# Patient Record
Sex: MALE | Race: WHITE | NOT HISPANIC OR LATINO | RURAL
[De-identification: names, ages, dates, MRNs, and addresses within clinical notes are randomized per-mention and may not be internally consistent; named-entity substitution may affect disease eponyms.]

---

## 2024-08-22 ENCOUNTER — OFFICE VISIT (OUTPATIENT)
Dept: RURAL CLINIC 4 | Facility: CLINIC | Age: 64
End: 2024-08-22
Payer: COMMERCIAL

## 2024-08-22 ENCOUNTER — DASHBOARD ENCOUNTERS (OUTPATIENT)
Age: 64
End: 2024-08-22

## 2024-08-22 VITALS
TEMPERATURE: 97.1 F | HEIGHT: 70 IN | SYSTOLIC BLOOD PRESSURE: 130 MMHG | DIASTOLIC BLOOD PRESSURE: 92 MMHG | HEART RATE: 70 BPM | WEIGHT: 215.6 LBS | BODY MASS INDEX: 30.86 KG/M2

## 2024-08-22 DIAGNOSIS — K70.30 ALCOHOLIC CIRRHOSIS, UNSPECIFIED WHETHER ASCITES PRESENT: ICD-10-CM

## 2024-08-22 PROBLEM — 14783006: Status: ACTIVE | Noted: 2024-08-22

## 2024-08-22 PROBLEM — 34742003: Status: ACTIVE | Noted: 2024-08-22

## 2024-08-22 PROBLEM — 420054005: Status: ACTIVE | Noted: 2024-08-22

## 2024-08-22 PROCEDURE — 99204 OFFICE O/P NEW MOD 45 MIN: CPT | Performed by: INTERNAL MEDICINE

## 2024-08-22 RX ORDER — POTASSIUM CHLORIDE 1500 MG/1
TABLET, EXTENDED RELEASE ORAL
Qty: 30 TABLET | Status: ACTIVE | COMMUNITY

## 2024-08-22 RX ORDER — METOPROLOL SUCCINATE 25 MG/1
TABLET, EXTENDED RELEASE ORAL
Qty: 30 TABLET | Status: DISCONTINUED | COMMUNITY

## 2024-08-22 RX ORDER — METRONIDAZOLE 500 MG/1
TABLET, FILM COATED ORAL
Qty: 10 TABLET | Status: DISCONTINUED | COMMUNITY

## 2024-08-22 RX ORDER — METRONIDAZOLE 500 MG/1
TAKE 1 TABLET BY MOUTH TWICE DAILY FOR 5 DAYS TABLET, FILM COATED ORAL
Qty: 10 EACH | Refills: 0 | Status: DISCONTINUED | COMMUNITY

## 2024-08-22 RX ORDER — LACTULOSE 10 G/15ML
SOLUTION ORAL
Qty: 900 MILLILITER | Status: DISCONTINUED | COMMUNITY

## 2024-08-22 RX ORDER — THIAMINE HCL 100 MG
1 TABLET TABLET ORAL ONCE A DAY
Status: ACTIVE | COMMUNITY

## 2024-08-22 RX ORDER — FOLIC ACID 1 MG/1
TAKE 1 TABLET BY MOUTH ONCE DAILY TABLET ORAL
Qty: 30 EACH | Refills: 0 | Status: ACTIVE | COMMUNITY

## 2024-08-22 RX ORDER — METOPROLOL SUCCINATE 25 MG/1
TAKE 1 TABLET BY MOUTH ONCE DAILY TABLET, EXTENDED RELEASE ORAL
Qty: 30 EACH | Refills: 0 | Status: ACTIVE | COMMUNITY

## 2024-08-22 RX ORDER — ASPIRIN 325 MG/1
TAKE 1 TABLET BY MOUTH ONCE DAILY TABLET, COATED ORAL
Qty: 30 EACH | Refills: 0 | Status: ACTIVE | COMMUNITY

## 2024-08-22 RX ORDER — LACTULOSE 10 G/15ML
TAKE 30ML (20G) BY MOUTH DAILY FOR 30 DAYS. HOLD IF MORE THAN 3 LOOSE BMS DAILY SOLUTION ORAL
Qty: 900 MILLILITER | Refills: 0 | Status: ACTIVE | COMMUNITY

## 2024-08-22 NOTE — HPI-TODAY'S VISIT:
The pt is a 64 y/o gentleman, accompanied by his wife who is a registered nurse and is f/u from a recent hospitalization for SOB/chest pain and CTA incidentally reveals cirrhosis of the liver, portal hypertension and mild ascites. He said he has been drinking alcohol heavily for many yrs stating a quart of vodka will last 3 days and drinks  6-12 cans of Bud Light on daily basis. He has abstained from alcohol since hie was admitted to the hospital on 8/8/24.  MELD score of  10  was calculated during his hospitalization and had an elevated ammonia level of 80 and was started on Lactulose BID and continues. He has noticed some mild bilateral  LE edema and there has been no change in his abdominal girth. He offers no c/o jaundice, weakness or weight loss.

## 2024-08-28 PROBLEM — 161667004: Status: ACTIVE | Noted: 2024-08-28

## 2024-09-05 ENCOUNTER — TELEPHONE ENCOUNTER (OUTPATIENT)
Dept: RURAL CLINIC 4 | Facility: CLINIC | Age: 64
End: 2024-09-05

## 2024-09-05 RX ORDER — SPIRONOLACTONE 25 MG/1
1 TABLET TABLET, FILM COATED ORAL DAILY
Qty: 30 TABLET | Refills: 5 | OUTPATIENT
Start: 2024-09-10 | End: 2025-03-08

## 2024-09-10 PROBLEM — 449614009: Status: ACTIVE | Noted: 2024-09-10

## 2024-09-10 PROBLEM — 420054005: Status: ACTIVE | Noted: 2024-09-10

## 2024-10-02 ENCOUNTER — TELEPHONE ENCOUNTER (OUTPATIENT)
Dept: RURAL CLINIC 8 | Facility: CLINIC | Age: 64
End: 2024-10-02

## 2024-10-02 ENCOUNTER — TELEPHONE ENCOUNTER (OUTPATIENT)
Dept: RURAL CLINIC 4 | Facility: CLINIC | Age: 64
End: 2024-10-02

## 2024-10-02 ENCOUNTER — LAB OUTSIDE AN ENCOUNTER (OUTPATIENT)
Dept: RURAL CLINIC 8 | Facility: CLINIC | Age: 64
End: 2024-10-02

## 2024-10-02 PROBLEM — 1082601000119104: Status: ACTIVE | Noted: 2024-10-02

## 2024-10-07 ENCOUNTER — LAB OUTSIDE AN ENCOUNTER (OUTPATIENT)
Dept: RURAL CLINIC 4 | Facility: CLINIC | Age: 64
End: 2024-10-07

## 2024-10-14 ENCOUNTER — LAB OUTSIDE AN ENCOUNTER (OUTPATIENT)
Dept: URBAN - METROPOLITAN AREA CLINIC 19 | Facility: CLINIC | Age: 64
End: 2024-10-14

## 2024-10-15 ENCOUNTER — WEB ENCOUNTER (OUTPATIENT)
Dept: URBAN - METROPOLITAN AREA CLINIC 19 | Facility: CLINIC | Age: 64
End: 2024-10-15

## 2024-10-16 ENCOUNTER — OFFICE VISIT (OUTPATIENT)
Dept: URBAN - METROPOLITAN AREA CLINIC 19 | Facility: CLINIC | Age: 64
End: 2024-10-16
Payer: COMMERCIAL

## 2024-10-16 ENCOUNTER — LAB OUTSIDE AN ENCOUNTER (OUTPATIENT)
Dept: URBAN - METROPOLITAN AREA CLINIC 19 | Facility: CLINIC | Age: 64
End: 2024-10-16

## 2024-10-16 VITALS
TEMPERATURE: 97.7 F | WEIGHT: 193.4 LBS | SYSTOLIC BLOOD PRESSURE: 120 MMHG | DIASTOLIC BLOOD PRESSURE: 80 MMHG | HEIGHT: 70 IN | BODY MASS INDEX: 27.69 KG/M2 | HEART RATE: 83 BPM

## 2024-10-16 DIAGNOSIS — K74.69 OTHER CIRRHOSIS OF LIVER: ICD-10-CM

## 2024-10-16 DIAGNOSIS — Z95.2 HEART VALVE REPLACED: ICD-10-CM

## 2024-10-16 DIAGNOSIS — K76.6 PORTAL HYPERTENSION: ICD-10-CM

## 2024-10-16 PROCEDURE — 99214 OFFICE O/P EST MOD 30 MIN: CPT

## 2024-10-16 RX ORDER — POTASSIUM CHLORIDE 1500 MG/1
TABLET, EXTENDED RELEASE ORAL
Qty: 30 TABLET | Status: ACTIVE | COMMUNITY

## 2024-10-16 RX ORDER — METOPROLOL SUCCINATE 25 MG/1
TAKE 1 TABLET BY MOUTH ONCE DAILY TABLET, EXTENDED RELEASE ORAL
Qty: 30 EACH | Refills: 0 | Status: ACTIVE | COMMUNITY

## 2024-10-16 RX ORDER — FOLIC ACID 1 MG/1
TAKE 1 TABLET BY MOUTH ONCE DAILY TABLET ORAL
Qty: 30 EACH | Refills: 0 | Status: ACTIVE | COMMUNITY

## 2024-10-16 RX ORDER — LACTULOSE 10 G/15ML
TAKE 30ML (20G) BY MOUTH DAILY FOR 30 DAYS. HOLD IF MORE THAN 3 LOOSE BMS DAILY SOLUTION ORAL
Qty: 900 MILLILITER | Refills: 0 | Status: ACTIVE | COMMUNITY

## 2024-10-16 RX ORDER — THIAMINE HCL 100 MG
1 TABLET TABLET ORAL ONCE A DAY
Status: ACTIVE | COMMUNITY

## 2024-10-16 RX ORDER — METOLAZONE 10 MG/1
1 TABLET TABLET ORAL
Status: ACTIVE | COMMUNITY

## 2024-10-16 RX ORDER — SPIRONOLACTONE 25 MG/1
1 TABLET TABLET, FILM COATED ORAL DAILY
Qty: 30 TABLET | Refills: 5 | Status: ACTIVE | COMMUNITY
Start: 2024-09-10 | End: 2025-03-08

## 2024-10-16 RX ORDER — ASPIRIN 325 MG/1
TAKE 1 TABLET BY MOUTH ONCE DAILY TABLET, COATED ORAL
Qty: 30 EACH | Refills: 0 | Status: ACTIVE | COMMUNITY

## 2024-10-16 NOTE — HPI-TODAY'S VISIT:
Mr. Troy is a 63-year-old male with PMH of alcohol cirrhosis with ascites, cardiac valve replacement, portal hypertension presents today for follow-up.  He was last seen in GI clinic by Tabitha Garza NP on 8/22/2024 for hospital follow-up where he was found incidentally with cirrhosis.  At that time he was started on lactulose twice daily and he had noticed mild BLE edema. His edema worsened and he was prescribed torsemide and spironolactone 25 mg daily with improvement. He underwent a paracentesis on 10/11/2024 with approximately 5100 mL removed. Last alcohol drink was in August and he is committed to sobriety. Had an echo and normal cardiac cath with Dr. Fontana.  8/30/2024 labs: Low sodium 135, BUN 8, albumin 3.1, elevated AST 68, , T. bili 1.6, elevated .6, absolute monocytes 1.1, normal AFP 6.0, elevated INR 1.20, PT 15.8

## 2024-10-17 ENCOUNTER — OFFICE VISIT (OUTPATIENT)
Dept: RURAL CLINIC 4 | Facility: CLINIC | Age: 64
End: 2024-10-17

## 2024-10-17 LAB
ALBUMIN: 2.8
ALKALINE PHOSPHATASE: 178
ALT (SGPT): 17
AMMONIA, PLASMA: 120
AST (SGOT): 38
BILIRUBIN, TOTAL: 0.9
BUN/CREATININE RATIO: 11
BUN: 11
CALCIUM: 8.2
CARBON DIOXIDE, TOTAL: 28
CHLORIDE: 95
CREATININE: 1.01
EGFR: 84
GLOBULIN, TOTAL: 4.1
GLUCOSE: 122
HEMATOCRIT: 42.2
HEMOGLOBIN: 14.7
INR: 1.1
MCH: 34.5
MCHC: 34.8
MCV: 99
NRBC: (no result)
ONE SPECIMEN IDENTIFIER: (no result)
PLATELETS: 329
POTASSIUM: 4.2
PROTEIN, TOTAL: 6.9
PROTHROMBIN TIME: 12.1
RBC: 4.26
RDW: 11.7
SODIUM: 136
WBC: 12.5

## 2024-10-23 ENCOUNTER — TELEPHONE ENCOUNTER (OUTPATIENT)
Dept: URBAN - METROPOLITAN AREA CLINIC 19 | Facility: CLINIC | Age: 64
End: 2024-10-23

## 2024-10-24 ENCOUNTER — LAB OUTSIDE AN ENCOUNTER (OUTPATIENT)
Dept: URBAN - METROPOLITAN AREA CLINIC 19 | Facility: CLINIC | Age: 64
End: 2024-10-24

## 2024-10-24 ENCOUNTER — TELEPHONE ENCOUNTER (OUTPATIENT)
Dept: URBAN - METROPOLITAN AREA CLINIC 19 | Facility: CLINIC | Age: 64
End: 2024-10-24

## 2024-10-24 RX ORDER — ALBUMIN (HUMAN) 12.5 G/50ML
GIVE 25 G IV DURING PARACENTESIS SOLUTION INTRAVENOUS
Qty: 1 | Refills: 0 | OUTPATIENT
Start: 2024-10-24 | End: 2024-10-25

## 2024-10-30 ENCOUNTER — LAB OUTSIDE AN ENCOUNTER (OUTPATIENT)
Dept: URBAN - METROPOLITAN AREA CLINIC 19 | Facility: CLINIC | Age: 64
End: 2024-10-30

## 2024-10-30 LAB
ALB BF TYPE: (no result)
ALBUMIN BF: <1
PERFORMING LAB: (no result)
PERFORMING LAB: (no result)
PROT BF TYPE: (no result)
PROTEIN BF: <2

## 2024-10-31 ENCOUNTER — WEB ENCOUNTER (OUTPATIENT)
Dept: URBAN - METROPOLITAN AREA CLINIC 19 | Facility: CLINIC | Age: 64
End: 2024-10-31

## 2024-11-22 ENCOUNTER — OFFICE VISIT (OUTPATIENT)
Dept: URBAN - METROPOLITAN AREA MEDICAL CENTER 25 | Facility: MEDICAL CENTER | Age: 64
End: 2024-11-22

## 2024-11-22 RX ORDER — ASPIRIN 325 MG/1
TAKE 1 TABLET BY MOUTH ONCE DAILY TABLET, COATED ORAL
Qty: 30 EACH | Refills: 0 | Status: ACTIVE | COMMUNITY

## 2024-11-22 RX ORDER — POTASSIUM CHLORIDE 1500 MG/1
TABLET, EXTENDED RELEASE ORAL
Qty: 30 TABLET | Status: ACTIVE | COMMUNITY

## 2024-11-22 RX ORDER — SPIRONOLACTONE 25 MG/1
1 TABLET TABLET, FILM COATED ORAL DAILY
Qty: 30 TABLET | Refills: 5 | Status: ACTIVE | COMMUNITY
Start: 2024-09-10 | End: 2025-03-08

## 2024-11-22 RX ORDER — LACTULOSE 10 G/15ML
TAKE 30ML (20G) BY MOUTH DAILY FOR 30 DAYS. HOLD IF MORE THAN 3 LOOSE BMS DAILY SOLUTION ORAL
Qty: 900 MILLILITER | Refills: 0 | Status: ACTIVE | COMMUNITY

## 2024-11-22 RX ORDER — THIAMINE HCL 100 MG
1 TABLET TABLET ORAL ONCE A DAY
Status: ACTIVE | COMMUNITY

## 2024-11-22 RX ORDER — METOPROLOL SUCCINATE 25 MG/1
TAKE 1 TABLET BY MOUTH ONCE DAILY TABLET, EXTENDED RELEASE ORAL
Qty: 30 EACH | Refills: 0 | Status: ACTIVE | COMMUNITY

## 2024-11-22 RX ORDER — FOLIC ACID 1 MG/1
TAKE 1 TABLET BY MOUTH ONCE DAILY TABLET ORAL
Qty: 30 EACH | Refills: 0 | Status: ACTIVE | COMMUNITY

## 2024-11-22 RX ORDER — METOLAZONE 10 MG/1
1 TABLET TABLET ORAL
Status: ACTIVE | COMMUNITY

## 2025-04-18 ENCOUNTER — LAB OUTSIDE AN ENCOUNTER (OUTPATIENT)
Dept: URBAN - METROPOLITAN AREA CLINIC 19 | Facility: CLINIC | Age: 65
End: 2025-04-18

## 2025-04-18 ENCOUNTER — OFFICE VISIT (OUTPATIENT)
Dept: URBAN - METROPOLITAN AREA CLINIC 19 | Facility: CLINIC | Age: 65
End: 2025-04-18
Payer: COMMERCIAL

## 2025-04-18 DIAGNOSIS — Z12.11 SCREENING FOR COLON CANCER: ICD-10-CM

## 2025-04-18 DIAGNOSIS — K74.60: ICD-10-CM

## 2025-04-18 PROCEDURE — 99215 OFFICE O/P EST HI 40 MIN: CPT

## 2025-04-18 RX ORDER — THIAMINE HCL 100 MG
1 TABLET TABLET ORAL ONCE A DAY
Status: ACTIVE | COMMUNITY

## 2025-04-18 RX ORDER — FOLIC ACID 1 MG/1
TAKE 1 TABLET BY MOUTH ONCE DAILY TABLET ORAL
Qty: 30 EACH | Refills: 0 | Status: ACTIVE | COMMUNITY

## 2025-04-18 RX ORDER — ASPIRIN 325 MG/1
TAKE 1 TABLET BY MOUTH ONCE DAILY TABLET, COATED ORAL
Qty: 30 EACH | Refills: 0 | Status: DISCONTINUED | COMMUNITY

## 2025-04-18 RX ORDER — LACTULOSE 10 G/15ML
TAKE 30ML (20G) BY MOUTH DAILY FOR 30 DAYS. HOLD IF MORE THAN 3 LOOSE BMS DAILY SOLUTION ORAL
Qty: 900 MILLILITER | Refills: 0 | Status: DISCONTINUED | COMMUNITY

## 2025-04-18 RX ORDER — METOLAZONE 10 MG/1
1 TABLET TABLET ORAL
Status: DISCONTINUED | COMMUNITY

## 2025-04-18 RX ORDER — METOPROLOL SUCCINATE 25 MG/1
TAKE 1 TABLET BY MOUTH ONCE DAILY TABLET, EXTENDED RELEASE ORAL
Qty: 30 EACH | Refills: 0 | Status: ACTIVE | COMMUNITY

## 2025-04-18 RX ORDER — POTASSIUM CHLORIDE 1500 MG/1
TABLET, EXTENDED RELEASE ORAL
Qty: 30 TABLET | Status: ACTIVE | COMMUNITY

## 2025-04-18 NOTE — HPI-TODAY'S VISIT:
Mr. Troy is a 64-year-old male with PMH of alcohol cirrhosis with ascites (sober since 8/2024), and portal hypertension presents today for follow-up.  Last seen on 10/2024 and recommended an EGD for esophageal varices screening.  Today he reports feeling well. Denies abdominal pain, cofusion, edema. He accompanied by his wife who reports he is not being too complaint with salt restriction. He is having daily bowel movements. Has never had a colonoscopy.  Previous GI workup: Paracentesis sometime in 9/2024 with 500 ml removed. 10/14/2024 he underwent a paracentesis with 5100 mL removed.  Ascitic fluid negative for malignant cells. 11/22/2024 EGD with Dr. Velasquez noted a normal esophagus, stomach and duodenum. No specimens collected. Recommended repeating in 1 year for screening purposes.

## 2025-04-18 NOTE — PHYSICAL EXAM GASTROINTESTINAL
soft, nontender, mildly distended,  no guarding or rigidity,  no masses palpable,  normal bowel sounds,  no hepatosplenomegaly,  no rebound tenderness

## 2025-04-24 LAB
A/G RATIO: 1
AFP, SERUM: 5.4
AFP-L3%, SERUM: 7.6
ALBUMIN: 3.5
ALKALINE PHOSPHATASE: 143
ALT (SGPT): 13
AST (SGOT): 26
BILIRUBIN, TOTAL: 1.5
BUN/CREATININE RATIO: 7
BUN: 6
CALCIUM: 9
CARBON DIOXIDE, TOTAL: 28
CHLORIDE: 98
CREATININE: 0.85
DCP (DES GAMMA CARBOXY: 3.6
EGFR: 97
GLOBULIN, TOTAL: 3.5
GLUCOSE: 202
HEMATOCRIT: 41.9
HEMOGLOBIN: 14.4
INR: 1.1
MCH: 33.6
MCHC: 34.4
MCV: 97.9
MPV: 11.3
PLATELET COUNT: 234
POTASSIUM: 4.2
PROTEIN, TOTAL: 7
PT: 11.3
RDW: 12.5
RED BLOOD CELL COUNT: 4.28
SODIUM: 135
WHITE BLOOD CELL COUNT: 9.2

## 2025-05-06 ENCOUNTER — LAB OUTSIDE AN ENCOUNTER (OUTPATIENT)
Dept: URBAN - METROPOLITAN AREA CLINIC 19 | Facility: CLINIC | Age: 65
End: 2025-05-06

## 2025-05-06 ENCOUNTER — TELEPHONE ENCOUNTER (OUTPATIENT)
Dept: URBAN - METROPOLITAN AREA CLINIC 19 | Facility: CLINIC | Age: 65
End: 2025-05-06

## 2025-05-20 ENCOUNTER — LAB OUTSIDE AN ENCOUNTER (OUTPATIENT)
Dept: URBAN - METROPOLITAN AREA CLINIC 19 | Facility: CLINIC | Age: 65
End: 2025-05-20

## 2025-05-20 LAB
ALB BF TYPE: (no result)
ALBUMIN BF: 1.5
BODY FLUID APPEARANCE: CLEAR
BODY FLUID COLOR: YELLOW
BODY FLUID DIFF:: (no result)
BODY FLUID LYMPH: 34
BODY FLUID MONO/MACRO: 53
BODY FLUID NEUT: 13
BODY FLUID NUCLEATED CELLS: 364
BODY FLUID RBC: <2000
BODY FLUID TOTAL CELLS COUNTED: 100
BODY FLUID TYPE: (no result)
PERFORMING LAB: (no result)
PROT BF TYPE: (no result)
PROTEIN BF: 2.7